# Patient Record
Sex: FEMALE | Race: WHITE
[De-identification: names, ages, dates, MRNs, and addresses within clinical notes are randomized per-mention and may not be internally consistent; named-entity substitution may affect disease eponyms.]

---

## 2022-06-15 ENCOUNTER — DASHBOARD ENCOUNTERS (OUTPATIENT)
Age: 42
End: 2022-06-15

## 2022-06-15 ENCOUNTER — CLAIMS CREATED FROM THE CLAIM WINDOW (OUTPATIENT)
Dept: URBAN - METROPOLITAN AREA CLINIC 107 | Facility: CLINIC | Age: 42
End: 2022-06-15
Payer: OTHER GOVERNMENT

## 2022-06-15 ENCOUNTER — WEB ENCOUNTER (OUTPATIENT)
Dept: URBAN - METROPOLITAN AREA CLINIC 107 | Facility: CLINIC | Age: 42
End: 2022-06-15

## 2022-06-15 VITALS
SYSTOLIC BLOOD PRESSURE: 102 MMHG | DIASTOLIC BLOOD PRESSURE: 72 MMHG | HEART RATE: 112 BPM | HEIGHT: 69 IN | WEIGHT: 171.4 LBS | BODY MASS INDEX: 25.39 KG/M2 | TEMPERATURE: 98.3 F

## 2022-06-15 DIAGNOSIS — R93.2 ABNORMAL GALLBLADDER ULTRASOUND: ICD-10-CM

## 2022-06-15 DIAGNOSIS — R10.13 EPIGASTRIC PAIN: ICD-10-CM

## 2022-06-15 DIAGNOSIS — D72.829 LEUKOCYTOSIS, UNSPECIFIED: ICD-10-CM

## 2022-06-15 DIAGNOSIS — R11.0 NAUSEA: ICD-10-CM

## 2022-06-15 PROBLEM — 111583006: Status: ACTIVE | Noted: 2022-06-15

## 2022-06-15 PROBLEM — 79922009: Status: ACTIVE | Noted: 2022-06-15

## 2022-06-15 PROBLEM — 16898991000119100: Status: ACTIVE | Noted: 2022-06-15

## 2022-06-15 PROBLEM — 422587007: Status: ACTIVE | Noted: 2022-06-15

## 2022-06-15 PROCEDURE — 99204 OFFICE O/P NEW MOD 45 MIN: CPT | Performed by: INTERNAL MEDICINE

## 2022-06-15 RX ORDER — SIMETHICONE 40MG/0.6ML
10 ML AS NEEDED SUSPENSION, DROPS(FINAL DOSAGE FORM)(ML) ORAL THREE TIMES A DAY
Status: ACTIVE | COMMUNITY

## 2022-06-15 RX ORDER — DICYCLOMINE HYDROCHLORIDE 20 MG/1
1 TABLET TABLET ORAL THREE TIMES A DAY
Status: ACTIVE | COMMUNITY

## 2022-06-15 NOTE — HPI-TODAY'S VISIT:
Ms. Bronson is a 41-year-old female referred from the ER for evaluation of abdominal pain, nausea and vomiting.  Her  retired from [employer] and they recently moved to the area.  She does not have a PCP and has not seen GI before.  She started having severe epigastric pain radiating to her back last weekend.  THe pain woke her up in the middle of the night.  She also had nausea and vomiting.  She has chronic symptoms of alternating bowel habits, intermittent nausea and abdominal cramps.  She no longer eats meat, chicken or pork, but does eat shrimp.   When she had this most recent episode she was eating shrimp, fries dipped in alfred sauce and one alcoholic drink.   She otherwise denies fevers, chills, change in bowel habits, blood in the stool or weight loss.   Father with multiple GI issues including gallbladder problems and GERD.  No family history of colon cancer, inflammatory bowel disease GI cancers, peptic ulcer disease or chronic liver disease.  No significant alcohol use, smoking or illicit drug use.  She was using NSAIDs in the past mainly for headaches, but is only using them a few times a month.  She is using bentyl every 6 hours with only minimal relief.  Limited upper abdominal ultrasound 6/13/2022 revealed a mildly distended fluid-filled gallbladder without cholelithiasis or cholecystitis, measuring 3 mm.    Labs 6/13/2022 BUN 9, creatinine 0.7, AST 21, ALT 26, alk phos 66, T bili 0.4, albumin 4.5, lipase 32; WBC 14.4, hemoglobin 13.8, MCV 87, platelets 310

## 2022-06-15 NOTE — PHYSICAL EXAM GASTROINTESTINAL
Abdomen , soft, mild epigastric tenderness, nondistended , no guarding or rigidity , no masses palpable , normal bowel sounds , Liver and Spleen,  no hepatosplenomegaly , liver nontender

## 2022-07-13 ENCOUNTER — TELEPHONE ENCOUNTER (OUTPATIENT)
Dept: URBAN - METROPOLITAN AREA CLINIC 113 | Facility: CLINIC | Age: 42
End: 2022-07-13

## 2022-08-15 ENCOUNTER — OFFICE VISIT (OUTPATIENT)
Dept: URBAN - METROPOLITAN AREA CLINIC 113 | Facility: CLINIC | Age: 42
End: 2022-08-15

## 2022-08-15 RX ORDER — SIMETHICONE 40MG/0.6ML
10 ML AS NEEDED SUSPENSION, DROPS(FINAL DOSAGE FORM)(ML) ORAL THREE TIMES A DAY
Status: ACTIVE | COMMUNITY

## 2022-08-15 RX ORDER — DICYCLOMINE HYDROCHLORIDE 20 MG/1
1 TABLET TABLET ORAL THREE TIMES A DAY
Status: ACTIVE | COMMUNITY